# Patient Record
(demographics unavailable — no encounter records)

---

## 2025-06-02 NOTE — ADDENDUM
[FreeTextEntry1] : It was my pleasure to interact with Ms. JEREMY RUELAS today. In summary, Ms. JEREMY RUELAS has morbid obesity refractory to medical and dietary efforts for which She could benefit from weight loss surgery.  We discussed in detail the [place]-en-Y gastric bypass, sleeve gastrectomy, and modified duodenal switch (DENAE/SIPS). She understood that these procedures are done primarily minimally invasively (laparoscopically or robotically), but that there is a possibility of conversion to laparotomy. In this particular case, with their body mass index we discussed realistic expectations with respect to weight loss.  Approximately 50% of excess weight will be lost if She has gastric bypass or sleeve gastrectomy, or, approximately 60% if She  has DENAE/SIPS.  In order to maintain weight loss or lose more weight, She will be required to modify diet and exercise habits (the "tool" concept of weight loss surgery).  We discussed the necessity for continuous, life-long medical care following surgery and the necessity for taking mineral and vitamin supplements daily for the rest of life. We discussed the importance of high protein diet and daily exercise for maximal success.  We discussed complications that may follow bariatric surgery that include, but are not limited to, respiratory problems, pneumonia, thrombophlebitis, and pulmonary embolus.  We also discussed intra-abdominal complications including anastomotic leak, intra-abdominal infections, portal vein thrombosis and death that may result from bariatric surgery.  We discussed that there may be other complications related to a specific type of surgery, such as that gastric bypass patients may have severe dumping secondary to dietary indiscretion. With respect to sleeve gastrectomy we discussed the chances of having refractory GERD that may require intervention in the future including conversion to gastric bypass. We also discussed postoperative DVT prophylaxis to decrease the incidence of deep vein thrombosis when indicated.   I specifically addressed the patient regarding pregnancy.  Weight loss surgery patients should not become pregnant in the first two years following surgery because of the high risk of fetal malformation and miscarriage.  The prolonged discussion (greater than 45 minutes) centered primarily on the indications for surgery and evaluation of the risk/benefit ratio for Ms. JEREMY RUELAS and other weight loss alternatives. I answered all questions about bariatric surgery and possible complications to the best of my ability.  Once the preoperative evaluation is complete, I will review the chart and schedule the patient for a follow-up visit in order to answer any questions prior to scheduling surgery.  Plan: Nutrition, Psych evaluations Med evaluations EGD Interested in sleeve  I, Dr. Martha Jenkins, spent 50 minutes with the patient >50% counseling/coordination of care including, reviewing the patient's history, performing an examination, reviewing relevant labs and radiographic imaging, reviewing PCP and consultant notes, discussion of medical and surgical management of the diagnosis as well as associated risks and benefits, and completing documentation.

## 2025-06-02 NOTE — ASSESSMENT
[FreeTextEntry1] : Pt is a 24 y/o F with pmhx of morbid obesity BMI 41, no other past medical or surgical history, who presents today feeling well here for initial consultation for bariatric sx. Pt reports a longstanding hx of morbid obesity and states she has tried and failed various attempts at wt loss via diet, exercise, and ozempic without success and is seeking a longterm solution to her morbid obesity. Pt denies any hx of GERD. Pt denies any smoking or alcohol use. Pt expresses interest in the VSG. We discussed at length surgical and non-surgical options and that non surgical approaches are unlikely to lead to long term, sustained weight loss. We also discussed that surgery alone is unlikely to be successful but should rather be seen as a tool for weight loss to be integrated with physical activity and nutritional counseling. The patient verbalized understanding and agrees to proceed with the evaluation. All risks of surgery were explained to the patient including the risks of leaks, infections, blood clots and death.  I, Dr. Martha Jenkins personally performed the evaluation and management (E/M) services for this patient. That E/M includes conducting the clinically appropriate initial history &/or exam, assessing all conditions, and establishing the plan of care. Today, my PA, Peggy العراقي, was present during my evaluation and management service for this patient and assisted in scribing the encounter and was here to observe my E/M service for this patient and follow plan of care established by me going forward.

## 2025-06-02 NOTE — HISTORY OF PRESENT ILLNESS
[de-identified] : Pt is a 24 y/o F with pmhx of morbid obesity BMI 41, no other past medical or surgical history, who presents today feeling well here for initial consultation for bariatric sx. Pt reports a longstanding hx of morbid obesity and states she has tried and failed various attempts at wt loss via diet, exercise, and ozempic without success and is seeking a longterm solution to her morbid obesity. Pt denies any hx of GERD. Pt denies any smoking or alcohol use. Pt expresses interest in the VSG. We discussed at length surgical and non-surgical options and that non surgical approaches are unlikely to lead to long term, sustained weight loss. We also discussed that surgery alone is unlikely to be successful but should rather be seen as a tool for weight loss to be integrated with physical activity and nutritional counseling. The patient verbalized understanding and agrees to proceed with the evaluation. All risks of surgery were explained to the patient including the risks of leaks, infections, blood clots and death.
[de-identified] : Pt is a 24 y/o F with pmhx of morbid obesity BMI 41, no other past medical or surgical history, who presents today feeling well here for initial consultation for bariatric sx. Pt reports a longstanding hx of morbid obesity and states she has tried and failed various attempts at wt loss via diet, exercise, and ozempic without success and is seeking a longterm solution to her morbid obesity. Pt denies any hx of GERD. Pt denies any smoking or alcohol use. Pt expresses interest in the VSG. We discussed at length surgical and non-surgical options and that non surgical approaches are unlikely to lead to long term, sustained weight loss. We also discussed that surgery alone is unlikely to be successful but should rather be seen as a tool for weight loss to be integrated with physical activity and nutritional counseling. The patient verbalized understanding and agrees to proceed with the evaluation. All risks of surgery were explained to the patient including the risks of leaks, infections, blood clots and death.
59

## 2025-06-02 NOTE — PLAN
Chief complaint:   Chief Complaint   Patient presents with    Ear Pain     Patient states he has had ear pain x 6 days.   OTC ear drops, Hydrogen peroxide.       Vitals:  Visit Vitals  /75   Pulse 63   Temp 96.9 °F (36.1 °C) (Temporal)   Resp 16   Ht 6' (1.829 m)   Wt (!) 139.3 kg (307 lb)   SpO2 97%   BMI 41.64 kg/m²       HISTORY OF PRESENT ILLNESS      39-year-old male presents to the clinic today with complaint of 6 days of right ear pain.  He has tried some over-the-counter medication such as ibuprofen and Tylenol as well as trying cold packs and warm packs to the ear.  These therapies have been ineffective.  He has not been doing any swimming.  No injury to the ear.  No Q-tip use.  No drainage.  He is otherwise healthy and has no other associated issues.        Other significant problems:  Patient Active Problem List    Diagnosis Date Noted    Obstructive sleep apnea syndrome 05/16/2024     Priority: Low    Rash 05/02/2023     Priority: Low    Snoring 04/28/2022     Priority: Low    Obesity (BMI 30-39.9) 04/28/2022     Priority: Low    Tobacco use/quit 05/25/2021     Priority: Low    Attention deficit hyperactivity disorder (ADHD), predominantly inattentive type 05/25/2021     Priority: Low    Annual physical exam 05/25/2021     Priority: Low    Anxiety and depression 05/25/2021     Priority: Low       PAST MEDICAL, FAMILY AND SOCIAL HISTORY     Medications:  Current Outpatient Medications   Medication Sig Dispense Refill    cefdinir (OMNICEF) 300 MG capsule Take 1 capsule by mouth every 12 hours for 10 days. 20 capsule 0    neomycin-polymyxin-HC 1 % otic solution Apply 2 drops to affected ear(s) 3 times daily as needed for infection/inflammation 10 mL 0    sertraline (ZOLOFT) 100 MG tablet Take 1.5 tablets by mouth daily. 45 tablet 2    propRANolol (INDERAL) 20 MG tablet Take 1 tablet by mouth in the morning and 1 tablet in the evening. 60 tablet 2    ketoconazole (NIZORAL) 2 % shampoo Use to wash the  beard and mustache region 1-2 times per day until clear. Stop 120 mL 1    ketoconazole (NIZORAL) 2 % cream Use to apply to the beard and mustache region in the PM for 2 weeks stop when clear. 15 g 1    traZODone (DESYREL) 100 MG tablet Take 1 tablet by mouth at bedtime as needed for Sleep. 30 tablet 3    metroNIDAZOLE (METROGEL) 1 % gel Apply 1 application. topically daily. 30 g 1     No current facility-administered medications for this visit.       Allergies:  ALLERGIES:  No Known Allergies    Past Medical  History/Surgeries:  No past medical history on file.    No past surgical history on file.    Family History:  Family History   Problem Relation Age of Onset    Diabetes Mother     Patient is unaware of any medical problems Father        Social History:  Social History     Tobacco Use    Smoking status: Former    Smokeless tobacco: Never   Substance Use Topics    Alcohol use: Not on file       REVIEW OF SYSTEMS     Review of Systems   HENT:  Positive for ear pain.    All other systems reviewed and are negative.      PHYSICAL EXAM     Physical Exam  Vitals reviewed.   Constitutional:       Appearance: Normal appearance.   HENT:      Head: Normocephalic and atraumatic.      Comments: Right TM is erythematous and bulging.  External auditory canals are erythematous bilaterally, slightly macerated on the right.  No significant cerumen noted.  No periauricular tenderness.  No pain with manipulation of the pinna or tragus.  No cervical lymphadenopathy.     Nose: Nose normal.      Mouth/Throat:      Mouth: Mucous membranes are moist.      Pharynx: Oropharynx is clear.      Neck: Normal range of motion and neck supple.   Eyes:      Extraocular Movements: Extraocular movements intact.      Conjunctiva/sclera: Conjunctivae normal.      Pupils: Pupils are equal, round, and reactive to light.   Cardiovascular:      Rate and Rhythm: Normal rate.   Pulmonary:      Effort: Pulmonary effort is normal.   Musculoskeletal:          General: Normal range of motion.   Skin:     General: Skin is warm and dry.   Neurological:      General: No focal deficit present.      Mental Status: He is alert and oriented to person, place, and time.   Psychiatric:         Mood and Affect: Mood normal.         Behavior: Behavior normal.         Thought Content: Thought content normal.         Judgment: Judgment normal.         ASSESSMENT/PLAN     Otitis media, right  Otitis externa      #1 Medications: Omnicef, Cortisporin  #2 Follow up with Primary Care Provider for any worsening or changing symptoms.  #3 Follow up with Emergency Department for any worsening or changing symptoms.  #4 Maintain good hydration.  #5 Patient and family members understand the assessment and plan today and had no further questions.      ABDULAZIZ KongC      Supervising Physician:  Dr. Mary Kennedy     [FreeTextEntry1] : pt to begin workup for bariatric sx plan for VSG preop EGD